# Patient Record
Sex: FEMALE | Race: ASIAN | NOT HISPANIC OR LATINO | Employment: PART TIME | URBAN - METROPOLITAN AREA
[De-identification: names, ages, dates, MRNs, and addresses within clinical notes are randomized per-mention and may not be internally consistent; named-entity substitution may affect disease eponyms.]

---

## 2017-04-07 ENCOUNTER — TELEPHONE (OUTPATIENT)
Dept: OTHER | Facility: CLINIC | Age: 32
End: 2017-04-07

## 2017-04-19 ENCOUNTER — HOSPITAL ENCOUNTER (OUTPATIENT)
Dept: GENERAL RADIOLOGY | Facility: CLINIC | Age: 32
Discharge: HOME | End: 2017-04-19
Attending: NURSE PRACTITIONER | Admitting: NURSE PRACTITIONER
Payer: COMMERCIAL

## 2017-04-19 DIAGNOSIS — M25.571 RIGHT ANKLE PAIN: ICD-10-CM

## 2017-04-19 PROCEDURE — 73610 X-RAY EXAM OF ANKLE: CPT | Mod: RT

## 2017-05-08 ENCOUNTER — OFFICE VISIT (OUTPATIENT)
Dept: ORTHOPEDICS | Facility: CLINIC | Age: 32
End: 2017-05-08

## 2017-05-08 VITALS — HEIGHT: 63 IN | WEIGHT: 139 LBS | BODY MASS INDEX: 24.63 KG/M2

## 2017-05-08 DIAGNOSIS — S93.401A SPRAIN OF RIGHT ANKLE, UNSPECIFIED LIGAMENT, INITIAL ENCOUNTER: Primary | ICD-10-CM

## 2017-05-08 DIAGNOSIS — S82.891A AVULSION FRACTURE OF ANKLE, RIGHT, CLOSED, INITIAL ENCOUNTER: ICD-10-CM

## 2017-05-08 NOTE — LETTER
[facility] SPORTS MEDICINE  [address]  5th Floor  [facility] [address]  Phone: [phone]  Fax: [phone]       2017    Lashon Valdivia  [address] [facility] [address] [phone] (home)     :     1985      To Whom it May Concern:    This patient was seen today in clinic for her right ankle sprain with avulsion fracture.  She will not be at work from 17 to 17.  She will return to work without restriction 5/15/17.    Please contact me for questions or concerns.    Sincerely,        Narinder Osorio MD

## 2017-05-08 NOTE — PROGRESS NOTES
"Sports Medicine Clinic Visit    PCP: No primary care provider on file.    Lashon Valdivia is a 31 year old female who is seen  as self referral presenting with S/P right ankle avulsion fracture. Initially she was walking down stairs and inverted ankle. Didn't go to the doctor for 2 days. She delivers pizzas.    Injury: on 4/17/17 ankle inversion    Location of Pain: right ankle  Duration of Pain: 3 week(s)  Rating of Pain: 4/10  Pain is better with: Ice, Ibuprofen and Rest  Pain is worse with: walking, stairs, FWB on right.  Additional Features: Has cam boot, currently wearing shoe with ankle brace  Treatment so far consists of: Ice, Ibuprofen and Rest  Prior History of related problems: None    Ht 5' 2.5\" (1.588 m)  Wt 139 lb (63 kg)  BMI 25.02 kg/m2         PMH:  Past Medical History:   Diagnosis Date     Tobacco Use Disorder     Smokes 3 cigs a week       Active problem list:  Patient Active Problem List   Diagnosis     Tobacco use disorder     CARDIOVASCULAR SCREENING; LDL GOAL LESS THAN 160       FH:  Family History   Problem Relation Age of Onset     Hypertension Mother      Hypertension Father      Lipids Mother      Lipids Father      C.A.D. Maternal Aunt      fatal MI age in40's     Cardiovascular Paternal Uncle      bypass     Cardiovascular Paternal Grandmother      bypass     CEREBROVASCULAR DISEASE Maternal Aunt        SH:  Social History     Social History     Marital status: Single     Spouse name: N/A     Number of children: N/A     Years of education: N/A     Occupational History      [employer]      Social History Main Topics     Smoking status: Current Every Day Smoker     Smokeless tobacco: Not on file      Comment: occ - 3 cigs a week     Alcohol use Yes      Comment: 3 drinks per month     Drug use: No     Sexual activity: Yes     Partners: Female     Other Topics Concern     Not on file     Social History Narrative    Caffeine intake/servings daily - 0-1    Calcium " intake/servings daily - 2    Exercise 4 times weekly - describe biking, climbing, walking    Sunscreen used - No    Seatbelts used - Yes    Guns stored in the home - No    Self Breast Exam - No    Pap test up to date -  Yes    Eye exam up to date -  No    Dental exam up to date -  Yes    DEXA scan up to date -  Not Applicable    Flex Sig/Colonoscopy up to date -  Not Applicable    Mammography up to date -  Not Applicable    Immunizations reviewed and up to date - Yes    Abuse: Current or Past (Physical, Sexual or Emotional) - No    Do you feel safe in your environment - Yes    Do you cope well with stress - Yes and smokes sometimes with stress    Do you suffer from insomnia - No    Last updated by: Tricia Siegel  2/25/2009    Tricia Siegel M.A.                       MEDS:  See EMR, reviewed  ALL:  See EMR, reviewed    REVIEW OF SYSTEMS:  CONSTITUTIONAL:NEGATIVE for fever, chills, change in weight  INTEGUMENTARY/SKIN: NEGATIVE for worrisome rashes, moles or lesions  EYES: NEGATIVE for vision changes or irritation  ENT/MOUTH: NEGATIVE for ear, mouth and throat problems  RESP:NEGATIVE for significant cough or SOB  BREAST: NEGATIVE for masses, tenderness or discharge  CV: NEGATIVE for chest pain, palpitations or peripheral edema  GI: NEGATIVE for nausea, abdominal pain, heartburn, or change in bowel habits  :NEGATIVE for frequency, dysuria, or hematuria  :NEGATIVE for frequency, dysuria, or hematuria  NEURO: NEGATIVE for weakness, dizziness or paresthesias  ENDOCRINE: NEGATIVE for temperature intolerance, skin/hair changes  HEME/ALLERGY/IMMUNE: NEGATIVE for bleeding problems  PSYCHIATRIC: NEGATIVE for changes in mood or affect        SUBJECTIVE:  This 31-year-old female had a forced ankle inversion with a small avulsion fracture 3 weeks ago.  She wore a CAM walker boot for a short period of time and then discontinued it.  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  She is hoping to return to softball.  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  She denies recurrent problems with this ankle in the past.      OBJECTIVE:  She presents today without splint.  She is able to walk without a limp.  She feels it is improving.  She is nontender over the Achilles tendon, peroneal posterior tibial tendon, distal fibula, distal tibia.  Nontender at the anterior talofibular, calcaneofibular or posterior talofibular ligament and external rotation test is negative.  Gentle anterior drawer test is normal with no signs of instability.        IMAGING:  An initial x-ray from 3 weeks ago showed small avulsion fracture distal to the fibula and in the area of the lateral talus with no significant displacement.      ASSESSMENT:  Right-sided ankle sprain with small avulsion fracture.      PLAN:  Early range of motion and proprioception exercises were explained.  She can start to transition to an exercise bike.  She has a supportive ankle brace that she will wear when she returns to work for a few months to try to protect her from ankle inversion.  I think we will avoid physical therapy for now.  She knows that she will not return to softball until she is able to do sprinting and cutting maneuvers without difficulty and that may be an additional 2-3 weeks.  She would like 1 additional week away from work and she feels confident returning to work 05/15/2017.  Note was provided for work.

## 2017-05-08 NOTE — MR AVS SNAPSHOT
After Visit Summary   2017    Lashon Valdivia    MRN: [id number]           Patient Information     Date Of Birth          1985        Visit Information        Provider Department      2017 2:00 PM Narinder Osorio MD [facility] Sports Medicine        Today's Diagnoses     Sprain of right ankle, unspecified ligament, initial encounter    -  1    Avulsion fracture of ankle, right, closed, initial encounter           Follow-ups after your visit        Who to contact     Please call your clinic at [phone] to:    Ask questions about your health    Make or cancel appointments    Discuss your medicines    Learn about your test results    Speak to your doctor   If you have compliments or concerns about an experience at your clinic, or if you wish to file a complaint, please contact [facility] Patient Relations at [phone] or email us at [email or web address]         Additional Information About Your Visit        MyChart Information     [employer] is an electronic gateway that provides easy, online access to your medical records. With [employer], you can request a clinic appointment, read your test results, renew a prescription or communicate with your care team.     To sign up for [employer] visit the website at [email or web address]   You will be asked to enter the access code listed below, as well as some personal information. Please follow the directions to create your username and password.     Your access code is: [email or web address]  Expires: 2017  6:30 AM     Your access code will  in 90 days. If you need help or a new code, please contact your [facility] Clinic or call [phone] for assistance.        Care EveryWhere ID     This is your Care EveryWhere ID. This could be used by other organizations to access your [facility] medical records  [id number]        Your Vitals Were     Height BMI (Body Mass Index)        "         5' 2.5\" (1.588 m) 25.02 kg/m2           Blood Pressure from Last 3 Encounters:   04/20/09 120/68   04/15/09 130/70   04/13/09 104/76    Weight from Last 3 Encounters:   05/08/17 139 lb (63 kg)   04/20/09 141 lb (64 kg)   04/15/09 138 lb 12 oz (62.9 kg)              Today, you had the following     No orders found for display       Primary Care Provider    None Specified       No primary provider on file.        Thank you!     Thank you for choosing [facility]  for your care. Our goal is always to provide you with excellent care. Hearing back from our patients is one way we can continue to improve our services. Please take a few minutes to complete the written survey that you may receive in the mail after your visit with us. Thank you!             Your Updated Medication List - Protect others around you: Learn how to safely use, store and throw away your medicines at www.disposemymeds.org.          This list is accurate as of: 5/8/17 11:59 PM.  Always use your most recent med list.                   Brand Name Dispense Instructions for use    diazepam 2 MG tablet    VALIUM    10    1-2 TABLETS EVERY 6 HRS PRN       FIBER THERAPY PO      Reported on 5/8/2017       ibuprofen 200 MG capsule      1 CAPSULE EVERY 4 TO 6 HOURS AS NEEDED       medroxyPROGESTERone 150 MG/ML injection    DEPO-PROVERA    .9    Reported on 5/8/2017       NEURONTIN 300 MG capsule   Generic drug:  gabapentin     90    ONE CAPSULE AT NIGHT FOR 2 NIGHTS THEN ONE TABLET IN THE MORNING AND ONE AT NIGHT FOR 2 NIGHTS THEN ONE TABLET 3 TIMES PER DAY.       PERCOCET 5-325 MG per tablet   Generic drug:  oxyCODONE-acetaminophen     20 tabs    ONE TO TWO TABLETS EVERY 4 TO 6 HOURS AS NEEDED FOR PAIN       predniSONE 20 MG tablet    DELTASONE    18 tabs    Take 3 tabs po at once qd for 3 days, 2 tabs po at once qd for 3 days, 1 tab po qd for 3 days. Then Discontinue. Take tablets early in the day.       VICODIN 5-500 MG per tablet "   Generic drug:  HYDROcodone-acetaminophen     15    ONE TO TWO TABLETS EVERY 4 TO 6 HOURS AS NEEDED FOR PAIN

## 2017-05-08 NOTE — LETTER
"  5/8/2017      RE: Lashon Valdivia  [address]  [facility] [address]       Sports Medicine Clinic Visit    PCP: No primary care provider on file.    Lashon Valdivia is a 31 year old female who is seen  as self referral presenting with S/P right ankle avulsion fracture. Initially she was walking down stairs and inverted ankle. Didn't go to the doctor for 2 days. She delivers pizzas.    Injury: on 4/17/17 ankle inversion    Location of Pain: right ankle  Duration of Pain: 3 week(s)  Rating of Pain: 4/10  Pain is better with: Ice, Ibuprofen and Rest  Pain is worse with: walking, stairs, FWB on right.  Additional Features: Has cam boot, currently wearing shoe with ankle brace  Treatment so far consists of: Ice, Ibuprofen and Rest  Prior History of related problems: None    Ht 5' 2.5\" (1.588 m)  Wt 139 lb (63 kg)  BMI 25.02 kg/m2         PMH:  Past Medical History:   Diagnosis Date     Tobacco Use Disorder     Smokes 3 cigs a week       Active problem list:  Patient Active Problem List   Diagnosis     Tobacco use disorder     CARDIOVASCULAR SCREENING; LDL GOAL LESS THAN 160       FH:  Family History   Problem Relation Age of Onset     Hypertension Mother      Hypertension Father      Lipids Mother      Lipids Father      C.A.D. Maternal Aunt      fatal MI age in40's     Cardiovascular Paternal Uncle      bypass     Cardiovascular Paternal Grandmother      bypass     CEREBROVASCULAR DISEASE Maternal Aunt        SH:  Social History     Social History     Marital status: Single     Spouse name: N/A     Number of children: N/A     Years of education: N/A     Occupational History      [employer]      Social History Main Topics     Smoking status: Current Every Day Smoker     Smokeless tobacco: Not on file      Comment: occ - 3 cigs a week     Alcohol use Yes      Comment: 3 drinks per month     Drug use: No     Sexual activity: Yes     Partners: Female     Other Topics Concern     Not " on file     Social History Narrative    Caffeine intake/servings daily - 0-1    Calcium intake/servings daily - 2    Exercise 4 times weekly - describe biking, climbing, walking    Sunscreen used - No    Seatbelts used - Yes    Guns stored in the home - No    Self Breast Exam - No    Pap test up to date -  Yes    Eye exam up to date -  No    Dental exam up to date -  Yes    DEXA scan up to date -  Not Applicable    Flex Sig/Colonoscopy up to date -  Not Applicable    Mammography up to date -  Not Applicable    Immunizations reviewed and up to date - Yes    Abuse: Current or Past (Physical, Sexual or Emotional) - No    Do you feel safe in your environment - Yes    Do you cope well with stress - Yes and smokes sometimes with stress    Do you suffer from insomnia - No    Last updated by: Tricia Siegel  2/25/2009    Tricia Siegel M.A.                       MEDS:  See EMR, reviewed  ALL:  See EMR, reviewed    REVIEW OF SYSTEMS:  CONSTITUTIONAL:NEGATIVE for fever, chills, change in weight  INTEGUMENTARY/SKIN: NEGATIVE for worrisome rashes, moles or lesions  EYES: NEGATIVE for vision changes or irritation  ENT/MOUTH: NEGATIVE for ear, mouth and throat problems  RESP:NEGATIVE for significant cough or SOB  BREAST: NEGATIVE for masses, tenderness or discharge  CV: NEGATIVE for chest pain, palpitations or peripheral edema  GI: NEGATIVE for nausea, abdominal pain, heartburn, or change in bowel habits  :NEGATIVE for frequency, dysuria, or hematuria  :NEGATIVE for frequency, dysuria, or hematuria  NEURO: NEGATIVE for weakness, dizziness or paresthesias  ENDOCRINE: NEGATIVE for temperature intolerance, skin/hair changes  HEME/ALLERGY/IMMUNE: NEGATIVE for bleeding problems  PSYCHIATRIC: NEGATIVE for changes in mood or affect        SUBJECTIVE:  This 31-year-old female had a forced ankle inversion with a small avulsion fracture 3 weeks ago.  She wore a CAM walker boot for a short period of time and then discontinued it.  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  She is hoping to return to softball.  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  She denies recurrent problems with this ankle in the past.      OBJECTIVE:  She presents today without splint.  She is able to walk without a limp.  She feels it is improving.  She is nontender over the Achilles tendon, peroneal posterior tibial tendon, distal fibula, distal tibia.  Nontender at the anterior talofibular, calcaneofibular or posterior talofibular ligament and external rotation test is negative.  Gentle anterior drawer test is normal with no signs of instability.        IMAGING:  An initial x-ray from 3 weeks ago showed small avulsion fracture distal to the fibula and in the area of the lateral talus with no significant displacement.      ASSESSMENT:  Right-sided ankle sprain with small avulsion fracture.      PLAN:  Early range of motion and proprioception exercises were explained.  She can start to transition to an exercise bike.  She has a supportive ankle brace that she will wear when she returns to work for a few months to try to protect her from ankle inversion.  I think we will avoid physical therapy for now.  She knows that she will not return to softball until she is able to do sprinting and cutting maneuvers without difficulty and that may be an additional 2-3 weeks.  She would like 1 additional week away from work and she feels confident returning to work 05/15/2017.  Note was provided for work.       Narinder Osorio MD